# Patient Record
(demographics unavailable — no encounter records)

---

## 2025-01-09 NOTE — ASSESSMENT
[FreeTextEntry1] : #Actinic keratoses -new problem, uncertain prognosis -Cryotherapy was performed per procedure note. -asked pt to return for follow up if the spots do not resolve with cryotherapy The risks/benefits/alternatives of cryo-destruction was explained to the patient which, include but are not limited to redness, swelling, pain, blistering, scar, discoloration of skin, and recurrence. The patient expressed understanding of these risks and agreed to the procedure. 8 lesions treated with 2 cycles of LN2. The procedure was well tolerated, without complication. Wound care was reviewed.  #Multiple benign nevi - chronic, stable - I discussed the chronic nature and course of the condition - Photoprotection discussed, recommend daily broad-spectrum sunscreen, SPF 30 or greater, UPF hat, clothing. - Pt educated on ABCDE of melanoma - Recommend self-skin exam and annual skin exam by MD - Pt instructed to return for new or changing lesions especially if any moles start to change, itch, or bleed   # Seborrheic keratosis, trunk/extremities  - chronic, stable -I discussed the chronic nature and course of the condition -reviewed benign nature  #Hx of multiple BCC -tbse q 6 mos  RTC 6 mos

## 2025-01-09 NOTE — PHYSICAL EXAM
[Alert] : alert [Oriented x 3] : ~L oriented x 3 [Full Body Skin Exam Performed] : performed [FreeTextEntry3] : PE:   General: well-appearing, alert, in no acute distress  Full body skin exam performed examining scalp, head, face, ears, eyes, mouth, neck, chest, back, abdomen, axilla, b/l arms, b/l forearms, b/l hands, b/l fingernails, b/l thighs, b/l legs, b/l feet, b/l toenails, groin, buttocks Pertinent findings include: -scattered light brown to dark brown colored <6mm papules and macules on the trunk and extremities -brown stuck on papules, plaques on the trunk and extremities -pink gritty papules on scalp, forehead, nose, forearms

## 2025-01-09 NOTE — HISTORY OF PRESENT ILLNESS
[FreeTextEntry1] : tbse [de-identified] :  Trip Feliciano 74y male here for tbse hx of numerous BCCs, most recent on L ear 3 years ago also on chest, back, R forearm prev followed with a local derm who no longer accepts his insurance No new, changing, growing, bleeding, concerning skin lesions noticed

## 2025-02-18 NOTE — ASSESSMENT
[FreeTextEntry1] : AS- Just dx by echo Oct 2020.  JOSE A = 1.73 cm2. f/u study in 1 year.  No restrictions at this time.   EKG NL.  An echo done Dec 2024 reveals moderate AS, mild AI and MR.  Mean grad 31mm Hg, ratio 0.29, JOSE A 1.03 cm2.  Walks, yoga, has a .  No Sxs but told what to look for. f/u 6 mos.  Echo in 10 mos.   RBBB- new from 2020.

## 2025-02-18 NOTE — REASON FOR VISIT
[FreeTextEntry1] : 73 y/o M  with mild AS in 2020, JOSE A = 1.73 cm2.  Also  1+ AI, echo done 10/27/20.  Pt is without any cardiac Sxs.  2/18/25  An echo done Dec 2024 reveals moderate AS, mild AI and MR.  Mean grad 31mm Hg, ratio 0.29, JOSE A 1.03 cm2.  Walks, yoga, has a .  No Sxs.   EKG: NSR, normal axis and intervals, no ST-Tw abnormalities. 12/15/20 Normal Sinus Rhythm with a new Right Bundle Branch Block, nonspecific ST-T wave abnormalities 2/18/25

## 2025-02-18 NOTE — PHYSICAL EXAM
[General Appearance - Well Developed] : well developed [Normal Appearance] : normal appearance [Well Groomed] : well groomed [General Appearance - Well Nourished] : well nourished [No Deformities] : no deformities [General Appearance - In No Acute Distress] : no acute distress [Normal Conjunctiva] : the conjunctiva exhibited no abnormalities [Eyelids - No Xanthelasma] : the eyelids demonstrated no xanthelasmas [Heart Rate And Rhythm] : heart rate and rhythm were normal [Heart Sounds] : normal S1 and S2 [Respiration, Rhythm And Depth] : normal respiratory rhythm and effort [Exaggerated Use Of Accessory Muscles For Inspiration] : no accessory muscle use [Auscultation Breath Sounds / Voice Sounds] : lungs were clear to auscultation bilaterally [Abdomen Soft] : soft [Abdomen Tenderness] : non-tender [Abdomen Mass (___ Cm)] : no abdominal mass palpated [Abnormal Walk] : normal gait [Gait - Sufficient For Exercise Testing] : the gait was sufficient for exercise testing [Nail Clubbing] : no clubbing of the fingernails [Cyanosis, Localized] : no localized cyanosis [Petechial Hemorrhages (___cm)] : no petechial hemorrhages [Skin Color & Pigmentation] : normal skin color and pigmentation [] : no rash [No Venous Stasis] : no venous stasis [Skin Lesions] : no skin lesions [No Skin Ulcers] : no skin ulcer [No Xanthoma] : no  xanthoma was observed [Oriented To Time, Place, And Person] : oriented to person, place, and time [Affect] : the affect was normal [Mood] : the mood was normal [No Anxiety] : not feeling anxious [FreeTextEntry1] : ventral hernia

## 2025-06-18 NOTE — HEALTH RISK ASSESSMENT
[Yes] : Yes [2 - 3 times a week (3 pts)] : 2 - 3  times a week (3 points) [1 or 2 (0 pts)] : 1 or 2 (0 points) [Never (0 pts)] : Never (0 points) [No] : In the past 12 months have you used drugs other than those required for medical reasons? No [0] : 2) Feeling down, depressed, or hopeless: Not at all (0) [Time Spent: ___ Minutes] : I spent [unfilled] minutes performing a depression screening for this patient. [Never] : Never [No falls in past year] : Patient reported no falls in the past year [Patient reported colonoscopy was normal] : Patient reported colonoscopy was normal [With Significant Other] : lives with significant other [] :  [Employed] : employed [Fully functional (bathing, dressing, toileting, transferring, walking, feeding)] : Fully functional (bathing, dressing, toileting, transferring, walking, feeding) [Fully functional (using the telephone, shopping, preparing meals, housekeeping, doing laundry, using] : Fully functional and needs no help or supervision to perform IADLs (using the telephone, shopping, preparing meals, housekeeping, doing laundry, using transportation, managing medications and managing finances) [With Patient/Caregiver] : , with patient/caregiver [Designated Healthcare Proxy] : Designated healthcare proxy [Name: ___] : Health Care Proxy's Name: [unfilled]  [Relationship: ___] : Relationship: [unfilled] [de-identified] : cardiology, derm  [Audit-CScore] : 3 [de-identified] : walk, yoga, strength training  [de-identified] : not specific  [[id number]] : 0 [Reports changes in hearing] : Reports no changes in hearing [Reports changes in vision] : Reports no changes in vision [Reports changes in dental health] : Reports no changes in dental health [ColonoscopyDate] : 2019 [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

## 2025-06-18 NOTE — ASSESSMENT
[Vaccines Reviewed] : Immunizations reviewed today. Please see immunization details in the vaccine log within the immunization flowsheet.  [FreeTextEntry1] : 74M w/mod AS, AI, cervical radiculopathy, lumbar stenosis s/p lumbar fusion, here to establish care HCM- labs today, DEXA ordered, Cologuard for CRC Screening AS - f/u cardiology, TTE rewviewed Lumbar stenosis - refilled gabapentin 300mg qhs

## 2025-06-18 NOTE — PHYSICAL EXAM
[___/1] : [unfilled]/1   [___/3] : [unfilled]/3 [___/5] : [unfilled]/5 [___/4] : [unfilled]/4 [___/2] : [unfilled]/2 [___/8] : [unfilled]/8 [Normal] : Normal [No Acute Distress] : no acute distress [Well-Appearing] : well-appearing [No Focal Deficits] : no focal deficits [Normal Affect] : the affect was normal [Normal Insight/Judgement] : insight and judgment were intact [TextBox_2] : Y [TextBox_4] : >HS [SlumsTotal] : 28

## 2025-06-18 NOTE — HISTORY OF PRESENT ILLNESS
[FreeTextEntry1] : CPE  [de-identified] : 74M w/mod AS, AI, cervical radiculopathy, lumbar stenosis s/p lumbar fusion, here to establish care  Back pain - controlled with gabapentin qhs Moderate AS - known, recent TTE from Dec 2024 w/cards  Overall doing well, no concerns

## 2025-07-24 NOTE — PHYSICAL EXAM
[Alert] : alert [Oriented x 3] : ~L oriented x 3 [Full Body Skin Exam Performed] : performed [FreeTextEntry3] : PE:   General: well-appearing, alert, in no acute distress  Full body skin exam performed examining scalp, head, face, ears, eyes, mouth, neck, chest, back, abdomen, axilla, b/l arms, b/l forearms, b/l hands, b/l fingernails, b/l thighs, b/l legs, b/l feet, b/l toenails, groin, buttocks Pertinent findings include: -scattered light brown to dark brown colored <6mm papules and macules on the trunk and extremities -brown stuck on papules, plaques on the trunk and extremities -pink gritty papules on scalp, nasal bridge

## 2025-07-24 NOTE — HISTORY OF PRESENT ILLNESS
[FreeTextEntry1] : RPA- skin check [de-identified] : Trip Feliciano 74y male here for skin check  lv-01/09/25  here for tbse hx of numerous BCCs, most recent on L ear 3 years ago  prev followed with a local derm who no longer accepts his insurance No new, changing, growing, bleeding, concerning skin lesions noticed

## 2025-07-24 NOTE — ASSESSMENT
[FreeTextEntry1] : #Actinic keratoses- scalp, nasal bridge -new problem, uncertain prognosis -Cryotherapy was performed per procedure note. -asked pt to return for follow up if the spots do not resolve with cryotherapy The risks/benefits/alternatives of cryo-destruction was explained to the patient which, include but are not limited to redness, swelling, pain, blistering, scar, discoloration of skin, and recurrence. The patient expressed understanding of these risks and agreed to the procedure. 8 lesions treated with 2 cycles of LN2. The procedure was well tolerated, without complication. Wound care was reviewed.  #Inflamed seborrheic keratosis -R upper arm, L upper arm -chronic, painful The risks/benefits/alternatives of cryo-destruction was explained to the patient which, include but are not limited to redness, swelling, pain, blistering, scar, discoloration of skin, and recurrence. The patient expressed understanding of these risks and agreed to the procedure. 2 lesions treated with 2 cycles of LN2. The procedure was well tolerated, without complication. Wound care was reviewed.  #Multiple benign nevi - chronic, stable - I discussed the chronic nature and course of the condition - Photoprotection discussed, recommend daily broad-spectrum sunscreen, SPF 30 or greater, UPF hat, clothing. - Pt educated on ABCDE of melanoma - Recommend self-skin exam and annual skin exam by MD - Pt instructed to return for new or changing lesions especially if any moles start to change, itch, or bleed   # Seborrheic keratosis, trunk/extremities  - chronic, stable -I discussed the chronic nature and course of the condition -reviewed benign nature  #Hx of multiple BCC -tbse q 6 mos  RTC 6 mos